# Patient Record
Sex: MALE | Race: OTHER | Employment: OTHER | URBAN - METROPOLITAN AREA
[De-identification: names, ages, dates, MRNs, and addresses within clinical notes are randomized per-mention and may not be internally consistent; named-entity substitution may affect disease eponyms.]

---

## 2024-01-06 ENCOUNTER — HOSPITAL ENCOUNTER (INPATIENT)
Age: 59
LOS: 2 days | Discharge: HOME | DRG: 420 | End: 2024-01-08
Attending: EMERGENCY MEDICINE | Admitting: INTERNAL MEDICINE
Payer: MEDICAID

## 2024-01-06 DIAGNOSIS — E13.10 DIABETIC KETOACIDOSIS WITHOUT COMA ASSOCIATED WITH OTHER SPECIFIED DIABETES MELLITUS (HCC): Primary | ICD-10-CM

## 2024-01-06 DIAGNOSIS — I48.92 ATRIAL FLUTTER, UNSPECIFIED TYPE (HCC): ICD-10-CM

## 2024-01-06 DIAGNOSIS — E11.65 TYPE 2 DIABETES MELLITUS WITH HYPERGLYCEMIA (HCC): ICD-10-CM

## 2024-01-06 PROBLEM — E11.10 DKA, TYPE 2, NOT AT GOAL (HCC): Status: ACTIVE | Noted: 2024-01-06

## 2024-01-06 LAB
ABSOLUTE BANDS: 0.16 K/UL (ref 0–1)
ANION GAP SERPL CALCULATED.3IONS-SCNC: 15 MMOL/L (ref 9–17)
B-OH-BUTYR SERPL-MCNC: 0.95 MMOL/L (ref 0.02–0.27)
BANDS: 1 % (ref 0–10)
BASOPHILS # BLD: 0 K/UL (ref 0–0.2)
BASOPHILS NFR BLD: 0 % (ref 0–2)
BUN SERPL-MCNC: 28 MG/DL (ref 6–20)
CALCIUM SERPL-MCNC: 8.3 MG/DL (ref 8.6–10.4)
CHLORIDE SERPL-SCNC: 97 MMOL/L (ref 98–107)
CO2 SERPL-SCNC: 20 MMOL/L (ref 20–31)
CREAT SERPL-MCNC: 1.4 MG/DL (ref 0.7–1.2)
EOSINOPHIL # BLD: 0 K/UL (ref 0–0.4)
EOSINOPHILS RELATIVE PERCENT: 0 % (ref 0–4)
ERYTHROCYTE [DISTWIDTH] IN BLOOD BY AUTOMATED COUNT: 13.2 % (ref 11.5–14.9)
GFR SERPL CREATININE-BSD FRML MDRD: 58 ML/MIN/1.73M2
GLUCOSE BLD-MCNC: 284 MG/DL (ref 75–110)
GLUCOSE BLD-MCNC: 298 MG/DL (ref 75–110)
GLUCOSE SERPL-MCNC: 316 MG/DL (ref 70–99)
HCT VFR BLD AUTO: 29.7 % (ref 41–53)
HGB BLD-MCNC: 9.9 G/DL (ref 13.5–17.5)
LACTATE BLDV-SCNC: 1.6 MMOL/L (ref 0.5–2.2)
LYMPHOCYTES NFR BLD: 0.97 K/UL (ref 1–4.8)
LYMPHOCYTES RELATIVE PERCENT: 6 % (ref 24–44)
MCH RBC QN AUTO: 30 PG (ref 26–34)
MCHC RBC AUTO-ENTMCNC: 33.5 G/DL (ref 31–37)
MCV RBC AUTO: 89.7 FL (ref 80–100)
METAMYELOCYTES ABSOLUTE COUNT: 0.49 K/UL
METAMYELOCYTES: 3 %
MONOCYTES NFR BLD: 1.46 K/UL (ref 0.1–1.3)
MONOCYTES NFR BLD: 9 % (ref 1–7)
MORPHOLOGY: ABNORMAL
MYELOCYTES ABSOLUTE COUNT: 0.16 K/UL
MYELOCYTES: 1 %
NEUTROPHILS NFR BLD: 80 % (ref 36–66)
NEUTS SEG NFR BLD: 12.96 K/UL (ref 1.3–9.1)
PLATELET # BLD AUTO: 400 K/UL (ref 150–450)
PMV BLD AUTO: 7.4 FL (ref 6–12)
POTASSIUM SERPL-SCNC: 4.2 MMOL/L (ref 3.7–5.3)
RBC # BLD AUTO: 3.31 M/UL (ref 4.5–5.9)
SODIUM SERPL-SCNC: 132 MMOL/L (ref 135–144)
TROPONIN I SERPL HS-MCNC: 24 NG/L (ref 0–22)
WBC OTHER # BLD: 16.2 K/UL (ref 3.5–11)

## 2024-01-06 PROCEDURE — 83605 ASSAY OF LACTIC ACID: CPT

## 2024-01-06 PROCEDURE — 36415 COLL VENOUS BLD VENIPUNCTURE: CPT

## 2024-01-06 PROCEDURE — 2000000000 HC ICU R&B

## 2024-01-06 PROCEDURE — 82010 KETONE BODYS QUAN: CPT

## 2024-01-06 PROCEDURE — 85025 COMPLETE CBC W/AUTO DIFF WBC: CPT

## 2024-01-06 PROCEDURE — 2580000003 HC RX 258: Performed by: STUDENT IN AN ORGANIZED HEALTH CARE EDUCATION/TRAINING PROGRAM

## 2024-01-06 PROCEDURE — 99285 EMERGENCY DEPT VISIT HI MDM: CPT

## 2024-01-06 PROCEDURE — 96365 THER/PROPH/DIAG IV INF INIT: CPT

## 2024-01-06 PROCEDURE — 93005 ELECTROCARDIOGRAM TRACING: CPT | Performed by: STUDENT IN AN ORGANIZED HEALTH CARE EDUCATION/TRAINING PROGRAM

## 2024-01-06 PROCEDURE — 82947 ASSAY GLUCOSE BLOOD QUANT: CPT

## 2024-01-06 PROCEDURE — 80048 BASIC METABOLIC PNL TOTAL CA: CPT

## 2024-01-06 PROCEDURE — 6370000000 HC RX 637 (ALT 250 FOR IP): Performed by: STUDENT IN AN ORGANIZED HEALTH CARE EDUCATION/TRAINING PROGRAM

## 2024-01-06 PROCEDURE — 84484 ASSAY OF TROPONIN QUANT: CPT

## 2024-01-06 RX ORDER — DEXTROSE AND SODIUM CHLORIDE 5; .45 G/100ML; G/100ML
INJECTION, SOLUTION INTRAVENOUS CONTINUOUS PRN
Status: DISCONTINUED | OUTPATIENT
Start: 2024-01-06 | End: 2024-01-08 | Stop reason: HOSPADM

## 2024-01-06 RX ORDER — 0.9 % SODIUM CHLORIDE 0.9 %
1000 INTRAVENOUS SOLUTION INTRAVENOUS ONCE
Status: COMPLETED | OUTPATIENT
Start: 2024-01-06 | End: 2024-01-07

## 2024-01-06 RX ORDER — SODIUM CHLORIDE 9 MG/ML
INJECTION, SOLUTION INTRAVENOUS CONTINUOUS
Status: DISCONTINUED | OUTPATIENT
Start: 2024-01-06 | End: 2024-01-08 | Stop reason: HOSPADM

## 2024-01-06 RX ORDER — POTASSIUM CHLORIDE 7.45 MG/ML
10 INJECTION INTRAVENOUS PRN
Status: DISCONTINUED | OUTPATIENT
Start: 2024-01-06 | End: 2024-01-08 | Stop reason: HOSPADM

## 2024-01-06 RX ORDER — MAGNESIUM SULFATE HEPTAHYDRATE 40 MG/ML
2000 INJECTION, SOLUTION INTRAVENOUS PRN
Status: DISCONTINUED | OUTPATIENT
Start: 2024-01-06 | End: 2024-01-08 | Stop reason: HOSPADM

## 2024-01-06 RX ADMIN — DEXTROSE AND SODIUM CHLORIDE: 5; 450 INJECTION, SOLUTION INTRAVENOUS at 22:31

## 2024-01-06 RX ADMIN — SODIUM CHLORIDE 4.76 UNITS/HR: 9 INJECTION, SOLUTION INTRAVENOUS at 22:29

## 2024-01-06 RX ADMIN — SODIUM CHLORIDE 1000 ML: 9 INJECTION, SOLUTION INTRAVENOUS at 22:11

## 2024-01-06 ASSESSMENT — PAIN - FUNCTIONAL ASSESSMENT: PAIN_FUNCTIONAL_ASSESSMENT: NONE - DENIES PAIN

## 2024-01-07 ENCOUNTER — APPOINTMENT (OUTPATIENT)
Dept: GENERAL RADIOLOGY | Age: 59
DRG: 420 | End: 2024-01-07
Payer: MEDICAID

## 2024-01-07 LAB
ANION GAP SERPL CALCULATED.3IONS-SCNC: 11 MMOL/L (ref 9–17)
ANION GAP SERPL CALCULATED.3IONS-SCNC: 12 MMOL/L (ref 9–17)
ANION GAP SERPL CALCULATED.3IONS-SCNC: 12 MMOL/L (ref 9–17)
ANION GAP SERPL CALCULATED.3IONS-SCNC: 14 MMOL/L (ref 9–17)
BASOPHILS # BLD: 0.1 K/UL (ref 0–0.2)
BASOPHILS NFR BLD: 1 % (ref 0–2)
BUN SERPL-MCNC: 17 MG/DL (ref 6–20)
BUN SERPL-MCNC: 18 MG/DL (ref 6–20)
BUN SERPL-MCNC: 19 MG/DL (ref 6–20)
BUN SERPL-MCNC: 19 MG/DL (ref 6–20)
BUN SERPL-MCNC: 21 MG/DL (ref 6–20)
BUN SERPL-MCNC: 26 MG/DL (ref 6–20)
CALCIUM SERPL-MCNC: 8.2 MG/DL (ref 8.6–10.4)
CALCIUM SERPL-MCNC: 8.3 MG/DL (ref 8.6–10.4)
CALCIUM SERPL-MCNC: 8.3 MG/DL (ref 8.6–10.4)
CALCIUM SERPL-MCNC: 8.4 MG/DL (ref 8.6–10.4)
CALCIUM SERPL-MCNC: 8.5 MG/DL (ref 8.6–10.4)
CALCIUM SERPL-MCNC: 8.7 MG/DL (ref 8.6–10.4)
CHLORIDE SERPL-SCNC: 100 MMOL/L (ref 98–107)
CHLORIDE SERPL-SCNC: 100 MMOL/L (ref 98–107)
CHLORIDE SERPL-SCNC: 101 MMOL/L (ref 98–107)
CHLORIDE SERPL-SCNC: 98 MMOL/L (ref 98–107)
CHLORIDE SERPL-SCNC: 99 MMOL/L (ref 98–107)
CHLORIDE SERPL-SCNC: 99 MMOL/L (ref 98–107)
CO2 SERPL-SCNC: 19 MMOL/L (ref 20–31)
CO2 SERPL-SCNC: 20 MMOL/L (ref 20–31)
CO2 SERPL-SCNC: 21 MMOL/L (ref 20–31)
CO2 SERPL-SCNC: 21 MMOL/L (ref 20–31)
CO2 SERPL-SCNC: 22 MMOL/L (ref 20–31)
CO2 SERPL-SCNC: 24 MMOL/L (ref 20–31)
CREAT SERPL-MCNC: 1.1 MG/DL (ref 0.7–1.2)
CREAT SERPL-MCNC: 1.2 MG/DL (ref 0.7–1.2)
CREAT SERPL-MCNC: 1.3 MG/DL (ref 0.7–1.2)
EOSINOPHIL # BLD: 0.1 K/UL (ref 0–0.4)
EOSINOPHILS RELATIVE PERCENT: 1 % (ref 0–4)
ERYTHROCYTE [DISTWIDTH] IN BLOOD BY AUTOMATED COUNT: 13.4 % (ref 11.5–14.9)
EST. AVERAGE GLUCOSE BLD GHB EST-MCNC: 258 MG/DL
GFR SERPL CREATININE-BSD FRML MDRD: >60 ML/MIN/1.73M2
GLUCOSE BLD-MCNC: 107 MG/DL (ref 75–110)
GLUCOSE BLD-MCNC: 112 MG/DL (ref 75–110)
GLUCOSE BLD-MCNC: 119 MG/DL (ref 75–110)
GLUCOSE BLD-MCNC: 129 MG/DL (ref 75–110)
GLUCOSE BLD-MCNC: 131 MG/DL (ref 75–110)
GLUCOSE BLD-MCNC: 165 MG/DL (ref 75–110)
GLUCOSE BLD-MCNC: 180 MG/DL (ref 75–110)
GLUCOSE BLD-MCNC: 181 MG/DL (ref 75–110)
GLUCOSE BLD-MCNC: 205 MG/DL (ref 75–110)
GLUCOSE BLD-MCNC: 218 MG/DL (ref 75–110)
GLUCOSE BLD-MCNC: 254 MG/DL (ref 75–110)
GLUCOSE BLD-MCNC: 267 MG/DL (ref 75–110)
GLUCOSE BLD-MCNC: 280 MG/DL (ref 75–110)
GLUCOSE BLD-MCNC: 399 MG/DL (ref 75–110)
GLUCOSE SERPL-MCNC: 141 MG/DL (ref 70–99)
GLUCOSE SERPL-MCNC: 234 MG/DL (ref 70–99)
GLUCOSE SERPL-MCNC: 249 MG/DL (ref 70–99)
GLUCOSE SERPL-MCNC: 253 MG/DL (ref 70–99)
GLUCOSE SERPL-MCNC: 262 MG/DL (ref 70–99)
GLUCOSE SERPL-MCNC: 277 MG/DL (ref 70–99)
HBA1C MFR BLD: 10.6 % (ref 4–6)
HCT VFR BLD AUTO: 31 % (ref 41–53)
HGB BLD-MCNC: 10.3 G/DL (ref 13.5–17.5)
LYMPHOCYTES NFR BLD: 1.7 K/UL (ref 1–4.8)
LYMPHOCYTES RELATIVE PERCENT: 12 % (ref 24–44)
MAGNESIUM SERPL-MCNC: 2 MG/DL (ref 1.6–2.6)
MAGNESIUM SERPL-MCNC: 2.1 MG/DL (ref 1.6–2.6)
MAGNESIUM SERPL-MCNC: 2.2 MG/DL (ref 1.6–2.6)
MAGNESIUM SERPL-MCNC: 2.2 MG/DL (ref 1.6–2.6)
MCH RBC QN AUTO: 30 PG (ref 26–34)
MCHC RBC AUTO-ENTMCNC: 33.2 G/DL (ref 31–37)
MCV RBC AUTO: 90.4 FL (ref 80–100)
MONOCYTES NFR BLD: 1.3 K/UL (ref 0.1–1.3)
MONOCYTES NFR BLD: 9 % (ref 1–7)
MYOGLOBIN SERPL-MCNC: 42 NG/ML (ref 28–72)
MYOGLOBIN SERPL-MCNC: 42 NG/ML (ref 28–72)
MYOGLOBIN SERPL-MCNC: 45 NG/ML (ref 28–72)
NEUTROPHILS NFR BLD: 77 % (ref 36–66)
NEUTS SEG NFR BLD: 10.7 K/UL (ref 1.3–9.1)
PHOSPHATE SERPL-MCNC: 2.8 MG/DL (ref 2.5–4.5)
PHOSPHATE SERPL-MCNC: 3 MG/DL (ref 2.5–4.5)
PHOSPHATE SERPL-MCNC: 3.1 MG/DL (ref 2.5–4.5)
PHOSPHATE SERPL-MCNC: 3.2 MG/DL (ref 2.5–4.5)
PHOSPHATE SERPL-MCNC: 3.8 MG/DL (ref 2.5–4.5)
PHOSPHATE SERPL-MCNC: 3.8 MG/DL (ref 2.5–4.5)
PLATELET # BLD AUTO: 398 K/UL (ref 150–450)
PMV BLD AUTO: 7.4 FL (ref 6–12)
POTASSIUM SERPL-SCNC: 3.5 MMOL/L (ref 3.7–5.3)
POTASSIUM SERPL-SCNC: 3.7 MMOL/L (ref 3.7–5.3)
POTASSIUM SERPL-SCNC: 3.9 MMOL/L (ref 3.7–5.3)
POTASSIUM SERPL-SCNC: 4 MMOL/L (ref 3.7–5.3)
POTASSIUM SERPL-SCNC: 4 MMOL/L (ref 3.7–5.3)
POTASSIUM SERPL-SCNC: 4.2 MMOL/L (ref 3.7–5.3)
RBC # BLD AUTO: 3.43 M/UL (ref 4.5–5.9)
SODIUM SERPL-SCNC: 131 MMOL/L (ref 135–144)
SODIUM SERPL-SCNC: 132 MMOL/L (ref 135–144)
SODIUM SERPL-SCNC: 133 MMOL/L (ref 135–144)
SODIUM SERPL-SCNC: 137 MMOL/L (ref 135–144)
TROPONIN I SERPL HS-MCNC: 15 NG/L (ref 0–22)
TROPONIN I SERPL HS-MCNC: 15 NG/L (ref 0–22)
TROPONIN I SERPL HS-MCNC: 16 NG/L (ref 0–22)
WBC OTHER # BLD: 13.8 K/UL (ref 3.5–11)

## 2024-01-07 PROCEDURE — 2500000003 HC RX 250 WO HCPCS: Performed by: INTERNAL MEDICINE

## 2024-01-07 PROCEDURE — 6370000000 HC RX 637 (ALT 250 FOR IP)

## 2024-01-07 PROCEDURE — 83874 ASSAY OF MYOGLOBIN: CPT

## 2024-01-07 PROCEDURE — 71045 X-RAY EXAM CHEST 1 VIEW: CPT

## 2024-01-07 PROCEDURE — 6360000002 HC RX W HCPCS: Performed by: NURSE PRACTITIONER

## 2024-01-07 PROCEDURE — 99223 1ST HOSP IP/OBS HIGH 75: CPT | Performed by: INTERNAL MEDICINE

## 2024-01-07 PROCEDURE — 6370000000 HC RX 637 (ALT 250 FOR IP): Performed by: INTERNAL MEDICINE

## 2024-01-07 PROCEDURE — 84100 ASSAY OF PHOSPHORUS: CPT

## 2024-01-07 PROCEDURE — 1200000000 HC SEMI PRIVATE

## 2024-01-07 PROCEDURE — 80048 BASIC METABOLIC PNL TOTAL CA: CPT

## 2024-01-07 PROCEDURE — 2580000003 HC RX 258: Performed by: INTERNAL MEDICINE

## 2024-01-07 PROCEDURE — 84484 ASSAY OF TROPONIN QUANT: CPT

## 2024-01-07 PROCEDURE — 83036 HEMOGLOBIN GLYCOSYLATED A1C: CPT

## 2024-01-07 PROCEDURE — 83735 ASSAY OF MAGNESIUM: CPT

## 2024-01-07 PROCEDURE — 2580000003 HC RX 258: Performed by: STUDENT IN AN ORGANIZED HEALTH CARE EDUCATION/TRAINING PROGRAM

## 2024-01-07 PROCEDURE — 82947 ASSAY GLUCOSE BLOOD QUANT: CPT

## 2024-01-07 PROCEDURE — A4216 STERILE WATER/SALINE, 10 ML: HCPCS | Performed by: INTERNAL MEDICINE

## 2024-01-07 PROCEDURE — 6360000002 HC RX W HCPCS: Performed by: STUDENT IN AN ORGANIZED HEALTH CARE EDUCATION/TRAINING PROGRAM

## 2024-01-07 PROCEDURE — 85025 COMPLETE CBC W/AUTO DIFF WBC: CPT

## 2024-01-07 PROCEDURE — 36415 COLL VENOUS BLD VENIPUNCTURE: CPT

## 2024-01-07 RX ORDER — DILTIAZEM HYDROCHLORIDE 180 MG/1
180 CAPSULE, EXTENDED RELEASE ORAL DAILY
COMMUNITY

## 2024-01-07 RX ORDER — INSULIN LISPRO 100 [IU]/ML
0-8 INJECTION, SOLUTION INTRAVENOUS; SUBCUTANEOUS
Status: DISCONTINUED | OUTPATIENT
Start: 2024-01-07 | End: 2024-01-08 | Stop reason: HOSPADM

## 2024-01-07 RX ORDER — FENOFIBRATE 145 MG/1
145 TABLET, COATED ORAL DAILY
COMMUNITY

## 2024-01-07 RX ORDER — PANTOPRAZOLE SODIUM 40 MG/1
40 TABLET, DELAYED RELEASE ORAL
Status: DISCONTINUED | OUTPATIENT
Start: 2024-01-07 | End: 2024-01-08 | Stop reason: HOSPADM

## 2024-01-07 RX ORDER — DEXTROSE AND SODIUM CHLORIDE 5; .45 G/100ML; G/100ML
INJECTION, SOLUTION INTRAVENOUS CONTINUOUS PRN
Status: DISCONTINUED | OUTPATIENT
Start: 2024-01-07 | End: 2024-01-07

## 2024-01-07 RX ORDER — METOPROLOL TARTRATE 50 MG/1
50 TABLET, FILM COATED ORAL 2 TIMES DAILY
Status: DISCONTINUED | OUTPATIENT
Start: 2024-01-07 | End: 2024-01-08 | Stop reason: HOSPADM

## 2024-01-07 RX ORDER — ASPIRIN 81 MG/1
81 TABLET ORAL DAILY
Status: DISCONTINUED | OUTPATIENT
Start: 2024-01-07 | End: 2024-01-08 | Stop reason: HOSPADM

## 2024-01-07 RX ORDER — INSULIN LISPRO 100 [IU]/ML
0-4 INJECTION, SOLUTION INTRAVENOUS; SUBCUTANEOUS NIGHTLY
Status: DISCONTINUED | OUTPATIENT
Start: 2024-01-07 | End: 2024-01-07

## 2024-01-07 RX ORDER — SODIUM CHLORIDE 450 MG/100ML
INJECTION, SOLUTION INTRAVENOUS CONTINUOUS
Status: DISCONTINUED | OUTPATIENT
Start: 2024-01-07 | End: 2024-01-08 | Stop reason: HOSPADM

## 2024-01-07 RX ORDER — 0.9 % SODIUM CHLORIDE 0.9 %
15 INTRAVENOUS SOLUTION INTRAVENOUS ONCE
Status: DISCONTINUED | OUTPATIENT
Start: 2024-01-07 | End: 2024-01-08 | Stop reason: HOSPADM

## 2024-01-07 RX ORDER — ENOXAPARIN SODIUM 100 MG/ML
40 INJECTION SUBCUTANEOUS DAILY
Status: DISCONTINUED | OUTPATIENT
Start: 2024-01-07 | End: 2024-01-08

## 2024-01-07 RX ORDER — GABAPENTIN 600 MG/1
600 TABLET ORAL 4 TIMES DAILY
COMMUNITY

## 2024-01-07 RX ORDER — CITALOPRAM 40 MG/1
40 TABLET ORAL DAILY
COMMUNITY

## 2024-01-07 RX ORDER — INSULIN LISPRO 100 [IU]/ML
0-4 INJECTION, SOLUTION INTRAVENOUS; SUBCUTANEOUS
Status: DISCONTINUED | OUTPATIENT
Start: 2024-01-07 | End: 2024-01-07

## 2024-01-07 RX ORDER — DULOXETIN HYDROCHLORIDE 60 MG/1
60 CAPSULE, DELAYED RELEASE ORAL DAILY
Status: DISCONTINUED | OUTPATIENT
Start: 2024-01-07 | End: 2024-01-08 | Stop reason: HOSPADM

## 2024-01-07 RX ORDER — MAGNESIUM SULFATE 1 G/100ML
1000 INJECTION INTRAVENOUS PRN
Status: DISCONTINUED | OUTPATIENT
Start: 2024-01-07 | End: 2024-01-07

## 2024-01-07 RX ORDER — FENOFIBRATE 54 MG/1
54 TABLET ORAL DAILY
Status: DISCONTINUED | OUTPATIENT
Start: 2024-01-07 | End: 2024-01-08 | Stop reason: HOSPADM

## 2024-01-07 RX ORDER — GABAPENTIN 300 MG/1
600 CAPSULE ORAL 4 TIMES DAILY
Status: DISCONTINUED | OUTPATIENT
Start: 2024-01-07 | End: 2024-01-08 | Stop reason: HOSPADM

## 2024-01-07 RX ORDER — INSULIN LISPRO 100 [IU]/ML
0-4 INJECTION, SOLUTION INTRAVENOUS; SUBCUTANEOUS NIGHTLY
Status: DISCONTINUED | OUTPATIENT
Start: 2024-01-07 | End: 2024-01-08 | Stop reason: HOSPADM

## 2024-01-07 RX ORDER — LISINOPRIL 20 MG/1
20 TABLET ORAL DAILY
Status: ON HOLD | COMMUNITY
End: 2024-01-08 | Stop reason: HOSPADM

## 2024-01-07 RX ORDER — INSULIN GLARGINE 100 [IU]/ML
30 INJECTION, SOLUTION SUBCUTANEOUS DAILY
Status: DISCONTINUED | OUTPATIENT
Start: 2024-01-07 | End: 2024-01-08 | Stop reason: HOSPADM

## 2024-01-07 RX ORDER — INSULIN LISPRO 100 [IU]/ML
6 INJECTION, SOLUTION INTRAVENOUS; SUBCUTANEOUS
Status: DISCONTINUED | OUTPATIENT
Start: 2024-01-07 | End: 2024-01-08 | Stop reason: HOSPADM

## 2024-01-07 RX ORDER — DULOXETIN HYDROCHLORIDE 60 MG/1
60 CAPSULE, DELAYED RELEASE ORAL DAILY
COMMUNITY

## 2024-01-07 RX ORDER — PANTOPRAZOLE SODIUM 40 MG/1
40 FOR SUSPENSION ORAL
COMMUNITY

## 2024-01-07 RX ORDER — POLYETHYLENE GLYCOL 3350 17 G/17G
17 POWDER, FOR SOLUTION ORAL DAILY PRN
Status: DISCONTINUED | OUTPATIENT
Start: 2024-01-07 | End: 2024-01-08 | Stop reason: HOSPADM

## 2024-01-07 RX ORDER — METOPROLOL TARTRATE 50 MG/1
50 TABLET, FILM COATED ORAL 2 TIMES DAILY
COMMUNITY

## 2024-01-07 RX ORDER — CITALOPRAM 40 MG/1
40 TABLET ORAL DAILY
Status: DISCONTINUED | OUTPATIENT
Start: 2024-01-07 | End: 2024-01-08 | Stop reason: HOSPADM

## 2024-01-07 RX ORDER — ASPIRIN 81 MG/1
81 TABLET ORAL DAILY
COMMUNITY

## 2024-01-07 RX ORDER — POTASSIUM CHLORIDE 7.45 MG/ML
10 INJECTION INTRAVENOUS PRN
Status: DISCONTINUED | OUTPATIENT
Start: 2024-01-07 | End: 2024-01-07

## 2024-01-07 RX ORDER — LISINOPRIL 20 MG/1
20 TABLET ORAL DAILY
Status: DISCONTINUED | OUTPATIENT
Start: 2024-01-07 | End: 2024-01-08 | Stop reason: HOSPADM

## 2024-01-07 RX ORDER — INSULIN LISPRO 100 [IU]/ML
5 INJECTION, SOLUTION INTRAVENOUS; SUBCUTANEOUS ONCE
Status: DISCONTINUED | OUTPATIENT
Start: 2024-01-07 | End: 2024-01-07

## 2024-01-07 RX ORDER — DILTIAZEM HYDROCHLORIDE 180 MG/1
180 CAPSULE, COATED, EXTENDED RELEASE ORAL DAILY
Status: DISCONTINUED | OUTPATIENT
Start: 2024-01-07 | End: 2024-01-08 | Stop reason: HOSPADM

## 2024-01-07 RX ADMIN — INSULIN LISPRO 6 UNITS: 100 INJECTION, SOLUTION INTRAVENOUS; SUBCUTANEOUS at 12:45

## 2024-01-07 RX ADMIN — GABAPENTIN 600 MG: 300 CAPSULE ORAL at 12:23

## 2024-01-07 RX ADMIN — INSULIN LISPRO 8 UNITS: 100 INJECTION, SOLUTION INTRAVENOUS; SUBCUTANEOUS at 12:44

## 2024-01-07 RX ADMIN — LISINOPRIL 20 MG: 20 TABLET ORAL at 10:15

## 2024-01-07 RX ADMIN — CITALOPRAM 40 MG: 40 TABLET, FILM COATED ORAL at 10:14

## 2024-01-07 RX ADMIN — INSULIN LISPRO 4 UNITS: 100 INJECTION, SOLUTION INTRAVENOUS; SUBCUTANEOUS at 17:22

## 2024-01-07 RX ADMIN — METOPROLOL TARTRATE 50 MG: 50 TABLET ORAL at 10:15

## 2024-01-07 RX ADMIN — POTASSIUM CHLORIDE 10 MEQ: 7.46 INJECTION, SOLUTION INTRAVENOUS at 09:10

## 2024-01-07 RX ADMIN — INSULIN LISPRO 6 UNITS: 100 INJECTION, SOLUTION INTRAVENOUS; SUBCUTANEOUS at 17:22

## 2024-01-07 RX ADMIN — METOPROLOL TARTRATE 50 MG: 50 TABLET ORAL at 22:07

## 2024-01-07 RX ADMIN — INSULIN GLARGINE 30 UNITS: 100 INJECTION, SOLUTION SUBCUTANEOUS at 09:08

## 2024-01-07 RX ADMIN — DEXTROSE AND SODIUM CHLORIDE: 5; 450 INJECTION, SOLUTION INTRAVENOUS at 03:07

## 2024-01-07 RX ADMIN — FENOFIBRATE 54 MG: 54 TABLET ORAL at 10:16

## 2024-01-07 RX ADMIN — GABAPENTIN 600 MG: 300 CAPSULE ORAL at 10:15

## 2024-01-07 RX ADMIN — ENOXAPARIN SODIUM 40 MG: 100 INJECTION SUBCUTANEOUS at 08:08

## 2024-01-07 RX ADMIN — POTASSIUM CHLORIDE 10 MEQ: 7.46 INJECTION, SOLUTION INTRAVENOUS at 10:19

## 2024-01-07 RX ADMIN — GABAPENTIN 600 MG: 300 CAPSULE ORAL at 17:20

## 2024-01-07 RX ADMIN — ASPIRIN 81 MG: 81 TABLET, COATED ORAL at 10:15

## 2024-01-07 RX ADMIN — GABAPENTIN 600 MG: 300 CAPSULE ORAL at 22:07

## 2024-01-07 RX ADMIN — DULOXETINE HYDROCHLORIDE 60 MG: 60 CAPSULE, DELAYED RELEASE ORAL at 10:15

## 2024-01-07 RX ADMIN — APIXABAN 5 MG: 5 TABLET, FILM COATED ORAL at 10:15

## 2024-01-07 RX ADMIN — DILTIAZEM HYDROCHLORIDE 180 MG: 180 CAPSULE, COATED, EXTENDED RELEASE ORAL at 10:15

## 2024-01-07 RX ADMIN — FAMOTIDINE 20 MG: 10 INJECTION, SOLUTION INTRAVENOUS at 10:15

## 2024-01-07 RX ADMIN — POTASSIUM CHLORIDE 10 MEQ: 7.46 INJECTION, SOLUTION INTRAVENOUS at 08:07

## 2024-01-07 RX ADMIN — APIXABAN 5 MG: 5 TABLET, FILM COATED ORAL at 22:07

## 2024-01-07 ASSESSMENT — PAIN SCALES - GENERAL
PAINLEVEL_OUTOF10: 0
PAINLEVEL_OUTOF10: 0

## 2024-01-07 ASSESSMENT — ENCOUNTER SYMPTOMS
ABDOMINAL PAIN: 1
DIARRHEA: 1
VOMITING: 1

## 2024-01-07 NOTE — CARE COORDINATION
(spouse)  Plans to Return to Present Housing: Yes  Other Identified Issues/Barriers to RETURNING to current housing: no barriers   Potential Assistance needed at discharge: Other (Comment) (Diabetic Management)            Potential DME:  none  Patient expects to discharge to: Apartment  Plan for transportation at discharge: Self    Financial    Payor: [place] MEDICAID / [facility]: [facility] / Product Type: *No Product type* /     Does insurance require precert for SNF: Yes    Potential assistance Purchasing Medications: No  Meds-to-Beds request:  no      [facility] - [place], [place] - [address] -  [phone] - F [phone]  [address] [address]  Phone: [phone] Fax: [phone]      Notes:    Factors facilitating achievement of predicted outcomes: Family support and Has needed Durable Medical Equipment at home    Barriers to discharge: Cognitive deficit, Limited insight into deficits, Communication deficit, Long standing deficits, and Medication managment    Additional Case Management Notes: 1/7/2024 [place] Medicaid from [facility] with spouse; DME glucometer, has a Free Angiodroide [place] (does not use due to sweat) ; VNS denies; off insulin drip, transfer out of ICU, new Afib, Eliquis new at d/c ? will need Diabetic Education and Dietician consult; poor compliance at home    The Plan for Transition of Care is related to the following treatment goals of DKA, type 2, not at goal (HCC) [E11.10]  Diabetic ketoacidosis without coma associated with other specified diabetes mellitus (HCC) [E13.10]  Atrial flutter, unspecified type (HCC) [I48.92]    IF APPLICABLE: The Patient and/or patient representative Tristan and his family were provided with a choice of provider and agrees with the discharge plan. Freedom of choice list with basic dialogue that supports the patient's individualized plan of care/goals and shares the quality data associated with the providers was provided to:  Patient       The Patient and/or Patient Representative Agree with the Discharge Plan? Yes    Rhea Perales RN  Case Management Department  Ph: [phone] Fax: [phone]

## 2024-01-07 NOTE — PROGRESS NOTES
Patient admitted to ICU 2009 and transferred to bed with standby assist. Monitors placed and patient assessed by primary RN. Safety precautions in place. Patient oriented to room and call light.

## 2024-01-07 NOTE — ED PROVIDER NOTES
[facility] ICU  Emergency Department Encounter  Emergency Medicine Resident     Pt Name:Tristan Goldman  MRN: [id number]  Birthdate 1965  Date of evaluation: 1/7/24  PCP:  No primary care provider on file.  Note Started: 1:43 AM EST      CHIEF COMPLAINT       Chief Complaint   Patient presents with    Diabetic Ketoacidosis       HISTORY OF PRESENT ILLNESS  (Location/Symptom, Timing/Onset, Context/Setting, Quality, Duration, Modifying Factors, Severity.)      Tristan Goldman is a 58 y.o. male who presented to [facility] ED due to abdominal pain, transferred here for higher level of care for hypotension and DKA.  Patient reports he presented to [facility] this morning due to abdominal pain he was having that started today.  He reports that he took his blood glucose at home and noted it was above 500 and was concerned he might be in DKA.  He reports he went to the ED for this issue.  He reports that he was having some nausea this morning but did take his daily meds prior to going to [facility] ED including him his metoprolol and his other medications.  Patient does have a history of atrial flutter which was recently diagnosed.  He denies any current sensation of nausea and reports he feels much better.  He denies any abdominal pain at this time.  Patient transferred over on insulin drip and transport did note that his glucose did decrease significantly in the span of 1 hour from roughly over 400 to low 200s.  They also placed him on a D5 half-normal infusion.  Transport notes that patient did not require any pressor support and they did bolus him 1 L of fluids which seem to improve pressures.    PAST MEDICAL / SURGICAL / SOCIAL / FAMILY HISTORY      has a past medical history of Diabetes mellitus (HCC) and Hypertension.     has a past surgical history that includes Cardiac surgery.    Social History     Socioeconomic History    Marital status:      Spouse name: Not on file    Number of children: Not on file     labs to assess electrolyte balance and continue insulin drip at this time.  Will plan for ICU admission due to DKA.  Unclear if there is another etiology for patient's leukocytosis or if secondary due to inflammation secondary due to DKA.  Anticipate there is likely some component of dehydration and hemoconcentration in play.  Patient's beta hydroxybutyrate was greater than 6 at outlying facility.  Will repeat at this time.  Additionally will obtain troponin and repeat EKG.  Patient is already anticoagulated on Eliquis.  He is currently tolerating p.o. intake and this likely and can be continued and we will not initiate heparin drip at this time.    Amount and/or Complexity of Data Reviewed  Labs: ordered.  ECG/medicine tests: ordered.    Risk  Prescription drug management.  Decision regarding hospitalization.        EKG    All EKG's are interpreted by the Emergency Department Physician who either signs or Co-signs this chart in the absence of a cardiologist.    EMERGENCY DEPARTMENT COURSE:    ED Course as of 01/07/24 0151   Sun Jan 07, 2024   0150 WBC(!): 16.2 [HO]   0150 Hemoglobin Quant(!): 9.9 [HO]   0151 Creatinine(!): 1.4 [HO]   0151 BUN,BUNPL(!): 28 [HO]   0151 Beta-Hydroxybutyrate(!): 0.95 [HO]   0151 Troponin, High Sensitivity(!): 24 [HO]      ED Course User Index  [HO] Nilda Cerna MD       PROCEDURES:  None    CONSULTS:  IP CONSULT TO INTERNAL MEDICINE  IP CONSULT TO CRITICAL CARE  IP CONSULT TO DIABETES EDUCATOR  IP CONSULT TO SPIRITUAL SERVICES    CRITICAL CARE:  There was significant risk of life threatening deterioration of patient's condition requiring my direct management. Critical care time 0 minutes, excluding any documented procedures.    FINAL IMPRESSION      1. Diabetic ketoacidosis without coma associated with other specified diabetes mellitus (HCC)    2. Atrial flutter, unspecified type (HCC)          DISPOSITION / PLAN     DISPOSITION Admitted 01/06/2024 11:46:42 PM      PATIENT

## 2024-01-07 NOTE — PLAN OF CARE
Problem: Hematologic - Adult  Goal: Maintains hematologic stability  Outcome: Progressing  Patient remains on insulin gtt, Blood sugars trending down   Problem: Safety - Adult  Goal: Free from fall injury  Outcome: Progressing   No falls/injuries this shift, bed in lowest position, brakes on, bed alarm on, call light in reach, side rails up x2  Problem: Skin/Tissue Integrity - Adult  Goal: Skin integrity remains intact  Outcome: Progressing   No new skin breakdown noted, no new signs/symptoms of infection, continue to monitor labwork including WBC, medications administered per physician orders  Problem: Pain  Goal: Verbalizes/displays adequate comfort level or baseline comfort level  Outcome: Progressing   No new signs/symptoms of pain noted, pain rating < 3 on scale 0-10, pain controlled with medication/repositioning

## 2024-01-07 NOTE — CONSULTS
[facility]   CONSULT NOTE:      DATE OF CONSULT 1/7/2024    REASON FOR CONSULTATION:  Critical care management      PCP No primary care provider on file.     CHIEF COMPLAINT: Transfer from [facility] for DKA    HISTORY OF PRESENT ILLNESS:     Tristan is a pleasant 58-year-old male with history of diabetes type 2 diagnosed approximately 6 years ago.  He is on multiple oral diabetic medications and also gives himself insulin.  Surprisingly he does not monitor his blood sugars anymore.  Early morning of January 7, around 3 AM, he developed epigastric and abdominal pain with a large bowel movement.  The pain persisted and he took some over-the-counter pain medicines and he came to the [facility] ER.  In the [facility] ER, he was found to be in diabetic ketoacidosis with a blood sugar in the 500s and an anion gap initially of 26.  He was placed on the DKA protocol and a second basic metabolic panel did show a improvement in the anion gap to 20.  However, there were no ICU beds.  He was given a total of 2 L of IV fluids.  He was placed on an insulin drip and there DKA protocol.  There was also an issue of possible atrial fibs flutter with ventricular response but he broke out of that with IV fluids.  He does have a history of atrial fibrillation and he is on Eliquis.  He did have a leukocytosis at 21.6 which subsequently has come down now to 16.    I was contacted by the ER physician at [facility] and accepted him for admission there but there were no beds and therefore I had them contact [facility] access to transfer him to [place].  Additionally, I also contacted Dr. Magen Shahid in the ER since it would be an ER to ER transfer.    By the time he arrived to [place], his anion gap had normalized.  He was still continued on the DKA protocol.  This morning, his repeat basic metabolic panel shows no anion gap acidosis.  In fact his blood sugars have been in the 100s.  He

## 2024-01-07 NOTE — PROGRESS NOTES
Writer was unable to reach the residents concerning patient stating he feels very tired and he is clammy . Spoke with Dr. Kemp states someone will come and check on the patient.

## 2024-01-07 NOTE — PROGRESS NOTES
Patient, has history of diabetes, atrial fibrillation on anticoagulation, history of pulmonary valve placement, depression  Patient admitted in [facility] with nausea, vomiting found to have DKA transferred to [facility] ICU  Patient at the time my evaluation he is clinically doing okay  His anion gap has closed twice  Blood sugar was less than 200  Patient required 35 unit of insulin from midnight to 8:30 AM, he told me at 1 point in time he was on 30 unit of insulin daily which she stopped 2 years ago  Only medication currently taking his Jardiance  Given the fact that patient is having DKA need to treat with insulin  Will start on 30 unit of insulin Lantus, 6 units Premeal and low-dose sliding scale  I requested RN to continue insulin drip for 2 more hours  Once insulin drip is stopped after 2 hours, change fluid to saline  Replacing potassium  Starting patient low-carb diet  Transfer out of ICU  Patient has high white count, no obvious source of infection, chest x-ray ordered by pulmonologist  May be hemoconcentration, since on admission his creatinine was also 1.4 and sodium was 132 which is improved with fluids  Repeating CBC  Will need diabetic education, patient not compliant with his diet  Lovenox for DVT prophylaxis  Full dictation to follow

## 2024-01-07 NOTE — PLAN OF CARE
[facility]  [facility] Phys[facility]       Pt Name: Tristan Goldman  MRN: [id number]  YOB: 1965  Date of evaluation: 1/6/24  PCP:  No primary care provider on file.    REASON FOR FLIGHT      Patient was transported from [facility] to [facility] due to hyperglycemia and hypotension. Flight was indicated in order to reduce risk of morbidity/mortality.     HISTORY OF PRESENT ILLNESS     Tristan Goldman is a 58 y.o. male who has a history of diabetes with recent admission to [facility] for DKA. Patient reports that he does not check his blood sugar at home but is supposed to, states he is on oral medications only. He developed severe epigastric abdominal pain at 3am with associated large bowel movement. States he did not improve at home with otc pain medications and was brought to [facility] ER. He was found to have ph7.4, ketones over what their lab could quantify, and blood sugar in the 500s. Patient was started on insulin gtt and given 2L IVF. Patient was also found to be in atrial flutter, which he reports he has a history of. He was planned to be admitted to the ICU there, but no beds were available.     His blood pressure dropped and decision was made to transfer him for ICU level of care. He reports that his abdominal pain improved with morphine. No longer experiencing any abdominal pain. RN rechecked BG prior to our leaving the department and had gone from 593 to 408. Then from 400s to 266 in 45 minutes.     WBC 21.6  Hgb 12.9  pH 7.433  Base excess -8.1  Glucose 593  K4.8->4.1  Lipase 375  Ketone >6      PRE HOSPITAL MEDICATIONS     Insulin gtt  Normal saline  Morphine    Insulin gtt  D5 1/2 NS    PHYSICAL EXAM        Physical Exam  Constitutional:       Appearance: He is ill-appearing. He is not diaphoretic.      Comments: Ketone odor   HENT:      Head: Normocephalic and atraumatic.      Nose: Nose normal.      Mouth/Throat:      Mouth: Mucous membranes are dry.   Eyes:       Conjunctiva/sclera: Conjunctivae normal.   Cardiovascular:      Rate and Rhythm: Tachycardia present. Rhythm irregular.      Heart sounds: Murmur heard.   Pulmonary:      Effort: Pulmonary effort is normal.      Breath sounds: Normal breath sounds. No wheezing, rhonchi or rales.   Abdominal:      General: Abdomen is flat.      Palpations: Abdomen is soft.      Comments: Epigastric ttp   Musculoskeletal:      Right lower leg: No edema.      Left lower leg: No edema.   Skin:     General: Skin is warm.      Capillary Refill: Capillary refill takes more than 3 seconds.   Neurological:      Mental Status: He is alert and oriented to person, place, and time.      Comments: Able to stand and pivot to stretcher   Psychiatric:         Mood and Affect: Mood normal.           MDM     This is a 58-year-old gentleman with history of diabetes presenting to [facility] ER with epigastric abdominal pain, hyperglycemia. Patient concerning for pancreatitis precipitating DKA with respiratory compensation. Prior to our arrival had a significant drop in BG over 45 minutes. Decreased rate of insulin gtt to 0.3 units/kg along with starting d5 1/2NS at 250cc/hr. Patient's K 4.1 prior to leaving [facility] and gave 20 meq of oral potassium. Blood pressures initially 80s/60s and given additional bolus of 1L normal saline as he is clinically still dehydrated. Patient mentating well despite hypotension. Blood pressure improved during transportation. Arrived to [facility] in stable condition, report given to staff there.     PROCEDURES:  None    CRITICAL CARE:  None    Destination     Patient arrived at [facility] ER in stable condition no significant changes in flight, all questions receiving staff had were answered.       Susan Abdul, DO  [facility] Physician     (Please note that portions of this note were completed with a voice recognition program.  Efforts were made to edit the dictations but occasionally words are mis-transcribed.)

## 2024-01-07 NOTE — PROGRESS NOTES
Multiple attempts made to reach phlebotomist re: BMP lab draw due @0200 without success.      0255 - reached  to update phlebotomist re: need for BMP draw per orders.

## 2024-01-07 NOTE — PLAN OF CARE
Problem: Discharge Planning  Goal: Discharge to home or other facility with appropriate resources  1/7/2024 1307 by Deanna Gaxiola RN  Outcome: Progressing     Problem: Safety - Adult  Goal: Free from fall injury  1/7/2024 1307 by Deanna Gaxiola RN  Outcome: Progressing     Problem: Skin/Tissue Integrity - Adult  Goal: Skin integrity remains intact  1/7/2024 1307 by Deanna Gaxiola RN  Outcome: Progressing     Problem: Hematologic - Adult  Goal: Maintains hematologic stability  1/7/2024 1307 by Deanna Gaxiola RN  Outcome: Progressing     Problem: Pain  Goal: Verbalizes/displays adequate comfort level or baseline comfort level  1/7/2024 1307 by Deanna Gaxiola RN  Outcome: Progressing     Problem: Chronic Conditions and Co-morbidities  Goal: Patient's chronic conditions and co-morbidity symptoms are monitored and maintained or improved  Outcome: Progressing

## 2024-01-07 NOTE — ED NOTES
Report given to ROGER Bose from Maura Oh RN.   Report method in person   The following was reviewed with receiving RN:   Current vital signs:  BP 98/66   Pulse (!) 110   Temp 98.2 °F (36.8 °C)   Resp 16   Ht 1.88 m (6' 2\")   Wt 99.8 kg (220 lb)   SpO2 96%   BMI 28.25 kg/m²                MEWS Score: 1     Any medication or safety alerts were reviewed. Any pending diagnostics and notifications were also reviewed, as well as any safety concerns or issues, abnormal labs, abnormal imaging, and abnormal assessment findings. Questions were answered.

## 2024-01-07 NOTE — ED PROVIDER NOTES
EMERGENCY DEPARTMENT ENCOUNTER   ATTENDING ATTESTATION     Pt Name: Tristan Goldman  MRN: [id number]  Birthdate 1965  Date of evaluation: 1/6/24       Tristan Goldman is a 58 y.o. male who presents with Diabetic Ketoacidosis      MDM: 58-year-old male presents as transfer from [facility] diagnosed with DKA    Currently on insulin drip, denies any complaints at this time    Will recheck labs    Labs are reviewed, white blood cell count 16.2 suspect likely reactive, CO2 at 20 gap of 15, glucose at 316, beta hydroxy 0.95    Will continue insulin drip, will admit    Discussed with Dr. Bey will evaluate    Spoke with NP for Dr. Kemp who accepts admission with pulmonology   consult. Patient demonstrates understanding and agreement with the plan, was given the opportunity to ask questions, and these questions were answered to the best of the provided information at this time. VS stable for transfer to floor.       This dictation was prepared using Dragon Medical voice recognition software.         Vitals:   Vitals:    01/07/24 0045 01/07/24 0100 01/07/24 0115 01/07/24 0300   BP:  109/69  112/64   Pulse: 100 100 99 (!) 102   Resp: 19 16 21 14   Temp:       TempSrc:       SpO2:  99% 96% 98%   Weight:       Height:             I personally saw and examined the patient. I have reviewed and agree with the resident's findings, including all diagnostic interpretations and treatment plan as written. I was present for the key portions of any procedures performed and the inclusive time noted for any critical care statement.    Cristo Win DO  Attending Emergency Physician           Cristo Win DO  01/07/24 0305

## 2024-01-07 NOTE — H&P
[facility]  IN-PATIENT SERVICE  [facility]  IN-PATIENT SERVICE   [facility]     HISTORY AND PHYSICAL EXAMINATION            Date:   1/7/2024  Patient name:  Tristan Goldman  Date of admission:  1/6/2024  9:40 PM  MRN:   [id number]  Account:  [id number]  YOB: 1965  PCP:    No primary care provider on file.  Room:   2009/2009-01  Code Status:    Full Code    Chief Complaint:     Chief Complaint   Patient presents with    Diabetic Ketoacidosis       History Obtained From:     patient      History of Present Illness:     58-year-old male with past medical history of type 2 diabetes mellitus, A-fib on Eliquis, history of pulmonic valve clinical with bioprosthetic valve,depression was transferred from [facility] with diagnosis of DKA to [facility] ICU.    He initially presented to [facility] last night complains of epigastric abdominal pain with large bowel movement not relieved by over-the-counter medication. In the [facility] ER, he was found to be in diabetic ketoacidosis with a blood sugar in the 500s and an anion gap initially of 26. He was placed on the DKA protocol and a second basic metabolic panel did show a improvement in the anion gap to 20.  However, there were no ICU beds.  He was given a total of 2 L of IV fluids.  He was placed on an insulin drip and there DKA protocol. There was also an issue of possible atrial fibs flutter with ventricular response but he broke out of that with IV fluids.  He does have a history of atrial fibrillation and he is on Eliquis.     He was admitted overnight in the ICU and was started on DKA protocol over here in [facility].  Today morning, he gaps as closed and his blood sugar has decreased to less than 200.  We were able weaning him down of insulin and normal saline and Lantus 20 units is started.    Past Medical History:     Past Medical     RDW 13.4 11.5 - 14.9 %    Platelets 398 150 - 450 k/uL    MPV 7.4 6.0 - 12.0 fL    Neutrophils % 77 (H) 36 - 66 %    Lymphocytes % 12 (L) 24 - 44 %    Monocytes % 9 (H) 1 - 7 %    Eosinophils % 1 0 - 4 %    Basophils % 1 0 - 2 %    Neutrophils Absolute 10.70 (H) 1.3 - 9.1 k/uL    Lymphocytes Absolute 1.70 1.0 - 4.8 k/uL    Monocytes Absolute 1.30 0.1 - 1.3 k/uL    Eosinophils Absolute 0.10 0.0 - 0.4 k/uL    Basophils Absolute 0.10 0.0 - 0.2 k/uL   POC Glucose Fingerstick    Collection Time: 01/07/24 10:06 AM   Result Value Ref Range    POC Glucose 205 (H) 75 - 110 mg/dL       Imaging/Diagnostics:  Holter monitor 3-5 days    Result Date: 12/29/2023    Primary rhythm atrial flutter/fibrillation throughout the duration of the study.  Average heart rate 85 beats per minute.  Heart rate ranged between 59 to from 153 beats per minute.   Rare PVCs and single ventricular couplet noted.  No sustained or nonsustained ventricular arrhythmias.   No diary submitted.      Assessment :      Primary Problem  DKA, type 2, not at goal (HCC)    Active Hospital Problems    Diagnosis Date Noted    DKA, type 2, not at goal (HCC) [E11.10] 01/06/2024       Plan:     Patient status Admit as inpatient in the  Medical ICU    Diabetic ketoacidosis   -Transferred from [facility] with a diagnosis of diabetic ketoacidosis  -Started on IV insulin drip with normal saline  -Improved over the course of the night  -Anion gap is closed 15,12   - this morning  -Weaning him off the insulin drip  -Injection Lantus 30 units daily started  -Low-dose sliding scale  -Insulin lispro Premeal 6 units 3 times daily  -Adult low carb diet resumed    Hypertension  -Continue lisinopril 20 Mg daily  - Tab Lopressor 50 Mg 2 times daily    History of A-fib  -Continue tab diltiazem 180 Mg daily  -Continue metoprolol for 50 Mg 2 times daily  -Tab Eliquis 5 mg twice daily    History of depression  -Continue Home med's Tab Citalopram 40 mg daily and

## 2024-01-08 VITALS
RESPIRATION RATE: 16 BRPM | DIASTOLIC BLOOD PRESSURE: 74 MMHG | HEART RATE: 89 BPM | WEIGHT: 227.07 LBS | BODY MASS INDEX: 29.14 KG/M2 | OXYGEN SATURATION: 98 % | SYSTOLIC BLOOD PRESSURE: 103 MMHG | TEMPERATURE: 98.2 F | HEIGHT: 74 IN

## 2024-01-08 PROBLEM — E11.10 DKA, TYPE 2, NOT AT GOAL (HCC): Status: RESOLVED | Noted: 2024-01-06 | Resolved: 2024-01-08

## 2024-01-08 LAB
ANION GAP SERPL CALCULATED.3IONS-SCNC: 11 MMOL/L (ref 9–17)
ANION GAP SERPL CALCULATED.3IONS-SCNC: 9 MMOL/L (ref 9–17)
BUN SERPL-MCNC: 15 MG/DL (ref 6–20)
BUN SERPL-MCNC: 15 MG/DL (ref 6–20)
CALCIUM SERPL-MCNC: 8.7 MG/DL (ref 8.6–10.4)
CALCIUM SERPL-MCNC: 9 MG/DL (ref 8.6–10.4)
CHLORIDE SERPL-SCNC: 101 MMOL/L (ref 98–107)
CHLORIDE SERPL-SCNC: 103 MMOL/L (ref 98–107)
CO2 SERPL-SCNC: 23 MMOL/L (ref 20–31)
CO2 SERPL-SCNC: 25 MMOL/L (ref 20–31)
CREAT SERPL-MCNC: 1.1 MG/DL (ref 0.7–1.2)
CREAT SERPL-MCNC: 1.2 MG/DL (ref 0.7–1.2)
GFR SERPL CREATININE-BSD FRML MDRD: >60 ML/MIN/1.73M2
GFR SERPL CREATININE-BSD FRML MDRD: >60 ML/MIN/1.73M2
GLUCOSE BLD-MCNC: 184 MG/DL (ref 75–110)
GLUCOSE BLD-MCNC: 267 MG/DL (ref 75–110)
GLUCOSE SERPL-MCNC: 164 MG/DL (ref 70–99)
GLUCOSE SERPL-MCNC: 175 MG/DL (ref 70–99)
MAGNESIUM SERPL-MCNC: 2.1 MG/DL (ref 1.6–2.6)
MAGNESIUM SERPL-MCNC: 2.4 MG/DL (ref 1.6–2.6)
PHOSPHATE SERPL-MCNC: 3.2 MG/DL (ref 2.5–4.5)
PHOSPHATE SERPL-MCNC: 3.3 MG/DL (ref 2.5–4.5)
POTASSIUM SERPL-SCNC: 4.2 MMOL/L (ref 3.7–5.3)
POTASSIUM SERPL-SCNC: 4.6 MMOL/L (ref 3.7–5.3)
SODIUM SERPL-SCNC: 135 MMOL/L (ref 135–144)
SODIUM SERPL-SCNC: 137 MMOL/L (ref 135–144)

## 2024-01-08 PROCEDURE — 82947 ASSAY GLUCOSE BLOOD QUANT: CPT

## 2024-01-08 PROCEDURE — 6370000000 HC RX 637 (ALT 250 FOR IP): Performed by: INTERNAL MEDICINE

## 2024-01-08 PROCEDURE — 84100 ASSAY OF PHOSPHORUS: CPT

## 2024-01-08 PROCEDURE — 36415 COLL VENOUS BLD VENIPUNCTURE: CPT

## 2024-01-08 PROCEDURE — 6370000000 HC RX 637 (ALT 250 FOR IP)

## 2024-01-08 PROCEDURE — 80048 BASIC METABOLIC PNL TOTAL CA: CPT

## 2024-01-08 PROCEDURE — 99239 HOSP IP/OBS DSCHRG MGMT >30: CPT | Performed by: INTERNAL MEDICINE

## 2024-01-08 PROCEDURE — 83735 ASSAY OF MAGNESIUM: CPT

## 2024-01-08 PROCEDURE — 6360000002 HC RX W HCPCS: Performed by: NURSE PRACTITIONER

## 2024-01-08 RX ORDER — INSULIN LISPRO 100 [IU]/ML
6 INJECTION, SOLUTION INTRAVENOUS; SUBCUTANEOUS
Qty: 30 ML | Refills: 1 | Status: SHIPPED | OUTPATIENT
Start: 2024-01-08

## 2024-01-08 RX ORDER — GABAPENTIN 300 MG/1
300 CAPSULE ORAL ONCE
Status: COMPLETED | OUTPATIENT
Start: 2024-01-08 | End: 2024-01-08

## 2024-01-08 RX ORDER — LISINOPRIL 5 MG/1
5 TABLET ORAL DAILY
Qty: 90 TABLET | Refills: 1 | Status: SHIPPED | OUTPATIENT
Start: 2024-01-08

## 2024-01-08 RX ORDER — INSULIN GLARGINE 100 [IU]/ML
30 INJECTION, SOLUTION SUBCUTANEOUS DAILY
Qty: 10 ML | Refills: 3 | Status: SHIPPED | OUTPATIENT
Start: 2024-01-09

## 2024-01-08 RX ORDER — INSULIN LISPRO 100 [IU]/ML
0-8 INJECTION, SOLUTION INTRAVENOUS; SUBCUTANEOUS
Qty: 30 ML | Refills: 1 | Status: SHIPPED | OUTPATIENT
Start: 2024-01-08

## 2024-01-08 RX ADMIN — INSULIN LISPRO 6 UNITS: 100 INJECTION, SOLUTION INTRAVENOUS; SUBCUTANEOUS at 09:46

## 2024-01-08 RX ADMIN — DILTIAZEM HYDROCHLORIDE 180 MG: 180 CAPSULE, COATED, EXTENDED RELEASE ORAL at 09:45

## 2024-01-08 RX ADMIN — GABAPENTIN 300 MG: 300 CAPSULE ORAL at 09:45

## 2024-01-08 RX ADMIN — GABAPENTIN 300 MG: 300 CAPSULE ORAL at 10:21

## 2024-01-08 RX ADMIN — CITALOPRAM 40 MG: 40 TABLET, FILM COATED ORAL at 09:45

## 2024-01-08 RX ADMIN — INSULIN LISPRO 4 UNITS: 100 INJECTION, SOLUTION INTRAVENOUS; SUBCUTANEOUS at 12:53

## 2024-01-08 RX ADMIN — GABAPENTIN 600 MG: 300 CAPSULE ORAL at 12:53

## 2024-01-08 RX ADMIN — METOPROLOL TARTRATE 50 MG: 50 TABLET ORAL at 09:45

## 2024-01-08 RX ADMIN — APIXABAN 5 MG: 5 TABLET, FILM COATED ORAL at 09:45

## 2024-01-08 RX ADMIN — PANTOPRAZOLE SODIUM 40 MG: 40 TABLET, DELAYED RELEASE ORAL at 07:08

## 2024-01-08 RX ADMIN — INSULIN GLARGINE 30 UNITS: 100 INJECTION, SOLUTION SUBCUTANEOUS at 09:45

## 2024-01-08 RX ADMIN — ASPIRIN 81 MG: 81 TABLET, COATED ORAL at 09:45

## 2024-01-08 RX ADMIN — DULOXETINE HYDROCHLORIDE 60 MG: 60 CAPSULE, DELAYED RELEASE ORAL at 09:45

## 2024-01-08 RX ADMIN — INSULIN LISPRO 6 UNITS: 100 INJECTION, SOLUTION INTRAVENOUS; SUBCUTANEOUS at 12:53

## 2024-01-08 NOTE — DISCHARGE INSTR - COC
Continuity of Care Form    Patient Name: Tristan Goldman   :  1965  MRN:  [id number]    Admit date:  2024  Discharge date:  ***    Code Status Order: Full Code   Advance Directives:     Admitting Physician:  Dani Kemp MD  PCP: No primary care provider on file.    Discharging Nurse: ***  Discharging Hospital Unit/Room#: 2078/2078-01  Discharging Unit Phone Number: ***    Emergency Contact:   Extended Emergency Contact Information  Primary Emergency Contact: Margaret Goldman  Home Phone: [phone]  Relation: Spouse  Secondary Emergency Contact: Elvia Carpenter  Relation: Child    Past Surgical History:  Past Surgical History:   Procedure Laterality Date    CARDIAC SURGERY         Immunization History:     There is no immunization history on file for this patient.    Active Problems:  Patient Active Problem List   Diagnosis Code   (none) - all problems resolved or deleted       Isolation/Infection:   Isolation            No Isolation          Patient Infection Status       None to display            Nurse Assessment:  Last Vital Signs: /78   Pulse 94   Temp 97.9 °F (36.6 °C)   Resp 18   Ht 1.88 m (6' 2\")   Wt 103 kg (227 lb 1.2 oz)   SpO2 98%   BMI 29.15 kg/m²     Last documented pain score (0-10 scale): Pain Level: 0  Last Weight:   Wt Readings from Last 1 Encounters:   24 103 kg (227 lb 1.2 oz)     Mental Status:  {IP PT MENTAL STATUS:}    IV Access:  { MARYSOL IV ACCESS:981095045}    Nursing Mobility/ADLs:  Walking   {CHP DME ADLs:002022795}  Transfer  {CHP DME ADLs:798485492}  Bathing  {CHP DME ADLs:327738656}  Dressing  {CHP DME ADLs:096119815}  Toileting  {CHP DME ADLs:557827464}  Feeding  {CHP DME ADLs:736876646}  Med Admin  {P DME ADLs:997688222}  Med Delivery   { MARYSOL MED Delivery:557113534}    Wound Care Documentation and Therapy:        Elimination:  Continence:   Bowel: {YES / NO:}  Bladder: {YES / NO:}  Urinary Catheter: {Urinary Catheter:898754231}    Colostomy/Ileostomy/Ileal Conduit: {YES / NO:}       Date of Last BM: ***    Intake/Output Summary (Last 24 hours) at 2024 1202  Last data filed at 2024 1846  Gross per 24 hour   Intake 720 ml   Output --   Net 720 ml     I/O last 3 completed shifts:  In: 1838.3 [P.O.:720; I.V.:1118.3]  Out: 2250 [Urine:2250]    Safety Concerns:     { MARYSOL Safety Concerns:952823169}    Impairments/Disabilities:      { MARYSOL Impairments/Disabilities:769607126}    Nutrition Therapy:  Current Nutrition Therapy:   { MARYSOL Diet List:762215574}    Routes of Feeding: {[facility] Other Feedings:849653094}  Liquids: {Slp liquid thickness:72166}  Daily Fluid Restriction: {[facility] DME Yes amt example:118744318}  Last Modified Barium Swallow with Video (Video Swallowing Test): {Done Not Done Date:}    Treatments at the Time of Hospital Discharge:   Respiratory Treatments: ***  Oxygen Therapy:  {Therapy; copd oxygen:73513}  Ventilator:    { CC Vent List:264939602}    Rehab Therapies: {THERAPEUTIC INTERVENTION:8256366240}  Weight Bearing Status/Restrictions: {[facility] Weight Bearin}  Other Medical Equipment (for information only, NOT a [facility] order):  {EQUIPMENT:702145448}  Other Treatments: ***    Patient's personal belongings (please select all that are sent with patient):  {[facility] Belongings:639825259}    RN SIGNATURE:  {Esignature:123433587}    CASE MANAGEMENT/SOCIAL WORK SECTION    Inpatient Status Date: ***    Readmission Risk Assessment Score:  Readmission Risk              Risk of Unplanned Readmission:  13           Discharging to Facility/ Agency   Name:   Address:  Phone:  Fax:    Dialysis Facility (if applicable)   Name:  Address:  Dialysis Schedule:  Phone:  Fax:    / signature: {Esignature:545960091}    PHYSICIAN SECTION    Prognosis: {Prognosis:9904697825}    Condition at Discharge: { Patient Condition:924906743}    Rehab Potential (if transferring to Rehab):

## 2024-01-08 NOTE — CARE COORDINATION
DISCHARGE PLANNING NOTE:    Plan remains for patient to be discharged home without needs. OP diabetic education order has been placed.    Will continue to follow for additional discharge needs.    Electronically signed by Elvia Jacobson RN on 1/8/2024 at 1:52 PM

## 2024-01-08 NOTE — PROGRESS NOTES
Discharge teaching and instructions for DKa  completed with patient using teachback method. AVS reviewed. Escripted Rxs to home pharmacy. Patient voiced understanding regarding prescriptions, follow up appointments, and care of self at home. Denies questions. IV d/c'd with cath intact and drsg applied. Skin Pk/W/D. Easy respirations. Denies pain. D/c'd with all belongings. Discharged ambulatory to  home with support per family. Patient refuses wheelchair and wants to walk to his car with his wife.     Writer went through patient's discharge instructions with insulin and diabetic education in much detail. Patient and wife verbalizes understanding and the importance of keeping a log of his blood sugars. Patient able to verbalize the risk factors that come from unmanaged diabetes.

## 2024-01-08 NOTE — PLAN OF CARE
58-year-old male with past medical history of type 2 diabetes mellitus, A-fib on Eliquis, history of pulmonic valve clinical with bioprosthetic valve,depression was transferred from [facility] with diagnosis of DKA to [place] ICU.     He was admitted in ICU and started with DKA protocol.  Patient received the infusion of regular insulin and normal saline overnight in the ICU.  His anion gap closed twice and patient's symptoms improved.  Insulin drip was stopped and patient was started on insulin Lantus 30 units daily along with medium dose corrective scale and Premeal insulin lispro 6 units 3 times daily.    Today morning his blood glucose is 175 and patient is not complaining of any epigastric pain and nausea vomiting.    Residents signing off    Electronically signed by Rasta Sesay MD on 1/8/2024 at 7:23 AM

## 2024-01-08 NOTE — DISCHARGE INSTRUCTIONS
Please follow up with primary care in one week  Please check sugars three times daily before meals, maintain log and take to primary care physician  Follow sliding scale on top of scheduled pre meal coverage  Low carb diet < 2000 cals/day  Request referral to endocrinology

## 2024-01-08 NOTE — DISCHARGE INSTR - DIET

## 2024-01-08 NOTE — PROGRESS NOTES
[facility] PULMONARY,CRITICAL CARE & SLEEP   Andrewjosh Archer MD/Renzo Fan MD/Niranjan EDMONDSON AGACNP-BC, NP-C      Roxane Leon APRN NP-C     Cristy Soto APRN NP-C                                           Pulmonary Progress Note    Patient - Tristan Goldman   Age - 58 y.o.   - 1965  MRN - [id number]  Acct # - [id number]  Date of Admission - 2024  9:40 PM    Consulting Service/Physician:       Primary Care Physician: No primary care provider on file.    SUBJECTIVE:     Chief Complaint:   Chief Complaint   Patient presents with    Diabetic Ketoacidosis     Subjective:    He is doing very well, he was weaned off the oxygen.  His blood sugars have stable.  He transferred out of ICU yesterday.  No pulmonary concerns or issues.    VITALS  /78   Pulse 94   Temp 97.9 °F (36.6 °C)   Resp 18   Ht 1.88 m (6' 2\")   Wt 103 kg (227 lb 1.2 oz)   SpO2 98%   BMI 29.15 kg/m²   Wt Readings from Last 3 Encounters:   24 103 kg (227 lb 1.2 oz)     I/O (24 Hours)    Intake/Output Summary (Last 24 hours) at 2024 1208  Last data filed at 2024 1846  Gross per 24 hour   Intake 720 ml   Output --   Net 720 ml     Ventilator:      Exam:   Physical Exam   Constitutional:  Oriented to person, place, and time. Appears well-developed and well-nourished.  Lying in bed no apparent distress  HENT: Unremarkable  Head: Normocephalic and atraumatic.   Eyes: EOM are normal. Pupils are equal, round, and reactive to light.   Neck: Neck supple.   Cardiovascular:  Regular rate and rhythm.  Normal heart tones.  No JVD.    Pulmonary/Chest: Lung sounds are clear throughout, respirations easy and unlabored  Abdominal: Soft. Bowel sounds are normal. There is no tenderness.   Musculoskeletal: Normal range of motion.   Neurological:patient is alert and oriented to person, place, and time.   Skin: Skin is warm and dry.   Extremities: No edema  Infusions:      sodium chloride       Units, 0-4 Units, SubCUTAneous, Nightly, Karan Whitehead MD    dextrose bolus 10% 125 mL, 125 mL, IntraVENous, PRN **OR** dextrose bolus 10% 250 mL, 250 mL, IntraVENous, PRNEryn Harmony R, MD    potassium chloride 10 mEq/100 mL IVPB (Peripheral Line), 10 mEq, IntraVENous, PRNEryn Harmony R, MD, Last Rate: 100 mL/hr at 01/07/24 1019, 10 mEq at 01/07/24 1019    magnesium sulfate 2000 mg in water 50 mL IVPB, 2,000 mg, IntraVENous, PRNEryn Harmony R, MD    sodium phosphate 15 mmol in sodium chloride 0.9 % 250 mL IVPB, 15 mmol, IntraVENous, Eryn HOFFMAN Harmony R, MD    dextrose 5 % and 0.45 % sodium chloride infusion, , IntraVENous, Continuous PRNEryn Harmony R, MD, Stopped at 01/07/24 1100    0.9 % sodium chloride infusion, , IntraVENous, Continuous, Nilda Cerna MD, Held at 01/06/24 2232    Lab Results:     Lab Results   Component Value Date    WBC 13.8 (H) 01/07/2024    HGB 10.3 (L) 01/07/2024    HCT 31.0 (L) 01/07/2024    MCV 90.4 01/07/2024     01/07/2024     Lab Results   Component Value Date    CALCIUM 8.7 01/08/2024     01/08/2024    K 4.2 01/08/2024    CO2 23 01/08/2024     01/08/2024    BUN 15 01/08/2024    CREATININE 1.1 01/08/2024       Radiology:   No new chest imaging    ASSESSMENT:       DKA-resolved  Type 2 diabetes-poor compliance and poor understanding of disease process  Leukocytosis  Atrial fibrillation on Eliquis  Hypertension  Overweight  Possible BERENICE  Non-smoker  PLAN:   He has been weaned off the oxygen  No pulmonary concerns at this time  Could follow-up in our office to discuss obstructive sleep apnea  No objection discharge from our standpoint  Discussed with RN  We will sign off, defer discharge to primary      Electronically signed by DEBO Jarrett CNP on 01/08/24     This progress note was completed using a voice transcription system. Every effort was made to ensure accuracy. However, inadvertent computerized transcription errors may

## 2024-01-09 LAB
EKG ATRIAL RATE: 271 BPM
EKG Q-T INTERVAL: 372 MS
EKG QRS DURATION: 96 MS
EKG QTC CALCULATION (BAZETT): 503 MS
EKG R AXIS: 19 DEGREES
EKG T AXIS: 55 DEGREES
EKG VENTRICULAR RATE: 110 BPM

## 2024-01-09 PROCEDURE — 93010 ELECTROCARDIOGRAM REPORT: CPT | Performed by: INTERNAL MEDICINE

## 2024-01-12 NOTE — PROGRESS NOTES
Physician Progress Note      PATIENT:               KHANH BUSTILLO  CSN #:                  [id number]  :                       1965  ADMIT DATE:       2024 9:40 PM  DISCH DATE:        2024 2:42 PM  RESPONDING  PROVIDER #:        Dani MAGAÑA MD          QUERY TEXT:    Patient admitted with DKA, noted to have Atrial fibrillation and is maintained   on Eliquis. If possible, please document in progress notes and discharge   summary if you are evaluating and/or treating any of the following:    The medical record reflects the following:  Risk Factors: DM, HTN    Clinical Indicators:  H&P,  \"History of A-fib, Continue tab diltiazem 180   Mg daily, Continue metoprolol for 50 Mg 2 times daily, Tab Eliquis 5 mg twice   daily\".    Treatment: Eliquis  Options provided:  -- Secondary hypercoagulable state in a patient with atrial fibrillation  -- Other - I will add my own diagnosis  -- Disagree - Not applicable / Not valid  -- Disagree - Clinically unable to determine / Unknown  -- Refer to Clinical Documentation Reviewer    PROVIDER RESPONSE TEXT:    This patient has secondary hypercoagulable state in a patient with atrial   fibrillation.    Query created by: Rilee, Cheryle on 2024 1:19 PM      Electronically signed by:  Dani MAGAÑA MD 2024 9:24 AM

## 2024-01-13 NOTE — DISCHARGE SUMMARY
[facility]   IN-PATIENT SERVICE   [facility]    Discharge Summary     Patient ID: Tristan Goldman  :  1965   MRN: [id number]     ACCOUNT:  [id number]   Patient's PCP: No primary care provider on file.  Admit Date: 2024   Discharge Date: 2024  Length of Stay: 2  Code Status:  Prior  Admitting Physician: Dani Kemp MD  Discharge Physician: Rajani Jasso MD     Active Discharge Diagnoses:     Primary Problem  DKA, type 2, not at goal ([facility])      Hospital Problems  Diabetic ketoacidosis  Atrial fibrillation, on Eliquis.  Rate controlled with metoprolol and diltiazem  Essential hypertension  History of depression, currently stable      Admission Condition:  poor     Discharged Condition: good    Hospital Stay:     Hospital Course:  Tristan Goldman is a 58 y.o. male who was admitted for the management of   DKA, type 2, not at goal (HCC) , presented to ER with Diabetic Ketoacidosis    58-year-old gentleman presented to the hospital with complaints of abdominal pain associated with nausea and vomiting.  He also reported having diarrhea.  On workup the patient was noted to be in DKA, was initiated on insulin drip and DKA protocol.  Once gap closed with the patient was transitioned to subcutaneous insulin, was tolerating diet well, hemodynamically stable and was discharged home.      Significant Diagnostic Studies:   Labs / Micro:  CBC:   Lab Results   Component Value Date/Time    WBC 13.8 2024 09:22 AM    RBC 3.43 2024 09:22 AM    HGB 10.3 2024 09:22 AM    HCT 31.0 2024 09:22 AM    MCV 90.4 2024 09:22 AM    MCH 30.0 2024 09:22 AM    MCHC 33.2 2024 09:22 AM    RDW 13.4 2024 09:22 AM     2024 09:22 AM     BMP:    Lab Results   Component Value Date/Time    GLUCOSE 175 2024 06:41 AM     2024 06:41 AM    K 4.2 2024 06:41 AM     2024 06:41 AM    CO2 23 2024 06:41 AM    ANIONGAP 11